# Patient Record
Sex: FEMALE | Race: BLACK OR AFRICAN AMERICAN | NOT HISPANIC OR LATINO | Employment: FULL TIME | URBAN - METROPOLITAN AREA
[De-identification: names, ages, dates, MRNs, and addresses within clinical notes are randomized per-mention and may not be internally consistent; named-entity substitution may affect disease eponyms.]

---

## 2021-08-19 PROBLEM — Z96.659 S/P KNEE REPLACEMENT: Status: ACTIVE | Noted: 2021-08-19

## 2022-03-10 PROBLEM — Z98.890 STATUS POST INCISION AND DRAINAGE: Status: ACTIVE | Noted: 2021-08-19

## 2022-03-10 PROBLEM — I10 ESSENTIAL HYPERTENSION: Status: ACTIVE | Noted: 2022-03-10

## 2022-03-10 PROBLEM — E11.9 TYPE 2 DIABETES MELLITUS WITHOUT COMPLICATION, WITH LONG-TERM CURRENT USE OF INSULIN: Status: ACTIVE | Noted: 2022-03-10

## 2022-03-10 PROBLEM — Z79.4 TYPE 2 DIABETES MELLITUS WITHOUT COMPLICATION, WITH LONG-TERM CURRENT USE OF INSULIN: Status: ACTIVE | Noted: 2022-03-10

## 2022-03-10 PROBLEM — G47.33 OSA (OBSTRUCTIVE SLEEP APNEA): Status: ACTIVE | Noted: 2022-03-10

## 2022-03-10 PROBLEM — T88.4XXA HARD TO INTUBATE: Status: ACTIVE | Noted: 2022-03-10

## 2022-03-11 PROBLEM — E87.5 HYPERKALEMIA: Status: ACTIVE | Noted: 2022-03-11

## 2022-03-11 PROBLEM — N17.9 AKI (ACUTE KIDNEY INJURY): Status: ACTIVE | Noted: 2022-03-11

## 2022-03-12 PROBLEM — K59.00 CONSTIPATION: Status: ACTIVE | Noted: 2022-03-12

## 2022-03-15 PROBLEM — D62 ACUTE BLOOD LOSS ANEMIA: Status: ACTIVE | Noted: 2022-03-15

## 2022-03-15 PROBLEM — E87.5 HYPERKALEMIA: Status: RESOLVED | Noted: 2022-03-11 | Resolved: 2022-03-15

## 2022-03-15 PROBLEM — N17.9 AKI (ACUTE KIDNEY INJURY): Status: RESOLVED | Noted: 2022-03-11 | Resolved: 2022-03-15

## 2022-03-15 PROBLEM — K59.00 CONSTIPATION: Status: RESOLVED | Noted: 2022-03-12 | Resolved: 2022-03-15

## 2022-03-21 PROBLEM — D64.9 NORMOCYTIC ANEMIA: Status: ACTIVE | Noted: 2022-03-21

## 2022-03-21 PROBLEM — D72.829 LEUKOCYTOSIS: Status: ACTIVE | Noted: 2022-03-21

## 2022-03-22 PROBLEM — M79.662 TENDERNESS OF LEFT CALF: Status: ACTIVE | Noted: 2022-03-22

## 2022-03-22 PROBLEM — N39.490 OVERFLOW INCONTINENCE: Status: ACTIVE | Noted: 2022-03-22

## 2022-03-22 PROBLEM — E11.65 TYPE 2 DIABETES MELLITUS WITH HYPERGLYCEMIA, WITH LONG-TERM CURRENT USE OF INSULIN: Status: ACTIVE | Noted: 2022-03-10

## 2022-03-27 PROBLEM — R00.1 SINUS BRADYCARDIA: Status: ACTIVE | Noted: 2022-03-27

## 2022-03-28 PROBLEM — R00.1 BRADYCARDIA: Status: RESOLVED | Noted: 2022-03-27 | Resolved: 2022-03-28

## 2022-03-28 PROBLEM — D72.829 LEUKOCYTOSIS: Status: RESOLVED | Noted: 2022-03-21 | Resolved: 2022-03-28

## 2022-03-28 PROBLEM — M79.662 TENDERNESS OF LEFT CALF: Status: RESOLVED | Noted: 2022-03-22 | Resolved: 2022-03-28

## 2022-05-13 PROBLEM — E11.9 INSULIN DEPENDENT TYPE 2 DIABETES MELLITUS: Status: ACTIVE | Noted: 2022-05-13

## 2022-05-13 PROBLEM — M80.059A PATHOLOGIC FRACTURE OF FEMUR ASSOCIATED WITH OSTEOPOROSIS: Status: ACTIVE | Noted: 2022-05-13

## 2022-05-13 PROBLEM — T84.50XA PROSTHETIC JOINT INFECTION: Status: ACTIVE | Noted: 2022-05-13

## 2022-05-13 PROBLEM — M86.052: Status: ACTIVE | Noted: 2022-05-13

## 2022-05-13 PROBLEM — Z79.4 INSULIN DEPENDENT TYPE 2 DIABETES MELLITUS: Status: ACTIVE | Noted: 2022-05-13

## 2022-06-04 PROBLEM — M86.10 ACUTE OSTEOMYELITIS: Status: ACTIVE | Noted: 2022-06-04

## 2022-06-04 PROBLEM — T84.7XXA INFECTED HARDWARE IN LEFT LEG: Status: ACTIVE | Noted: 2022-06-04

## 2022-06-04 PROBLEM — M86.062 ACUTE HEMATOGENOUS OSTEOMYELITIS OF LEFT TIBIA: Status: ACTIVE | Noted: 2022-06-04

## 2023-01-22 PROBLEM — Z53.20 PATIENT LEFT BEFORE TREATMENT COMPLETED: Status: ACTIVE | Noted: 2023-01-22

## 2023-03-01 PROBLEM — Z96.659 CHRONIC INFECTION OF PROSTHETIC KNEE, SUBSEQUENT ENCOUNTER: Status: ACTIVE | Noted: 2023-03-01

## 2023-03-01 PROBLEM — T84.59XD CHRONIC INFECTION OF PROSTHETIC KNEE, SUBSEQUENT ENCOUNTER: Status: ACTIVE | Noted: 2023-03-01

## 2023-03-01 PROBLEM — M25.562 CHRONIC PAIN OF LEFT KNEE: Status: ACTIVE | Noted: 2023-03-01

## 2023-03-01 PROBLEM — G89.29 CHRONIC PAIN OF LEFT KNEE: Status: ACTIVE | Noted: 2023-03-01

## 2023-03-05 PROBLEM — N17.9 AKI (ACUTE KIDNEY INJURY): Status: RESOLVED | Noted: 2022-03-11 | Resolved: 2023-03-05

## 2023-03-09 ENCOUNTER — PATIENT OUTREACH (OUTPATIENT)
Dept: ADMINISTRATIVE | Facility: CLINIC | Age: 63
End: 2023-03-09

## 2023-03-09 NOTE — PROGRESS NOTES
C3 nurse spoke with Philipp Noe ( daughter)  for a TCC post hospital discharge follow up call. The patient has a scheduled HOSFU appointment with Joel Lee MD on 03/18/2023 @ [facility].